# Patient Record
Sex: MALE | Employment: OTHER | URBAN - METROPOLITAN AREA
[De-identification: names, ages, dates, MRNs, and addresses within clinical notes are randomized per-mention and may not be internally consistent; named-entity substitution may affect disease eponyms.]

---

## 2020-04-17 ENCOUNTER — HOSPITAL ENCOUNTER (EMERGENCY)
Age: 35
Discharge: HOME | End: 2020-04-18
Attending: EMERGENCY MEDICINE
Payer: OTHER GOVERNMENT

## 2020-04-17 DIAGNOSIS — S61.412A LACERATION OF LEFT HAND WITHOUT FOREIGN BODY, INITIAL ENCOUNTER: ICD-10-CM

## 2020-04-17 DIAGNOSIS — S61.411A LACERATION OF RIGHT HAND WITHOUT FOREIGN BODY, INITIAL ENCOUNTER: Primary | ICD-10-CM

## 2020-04-17 DIAGNOSIS — S41.111A LACERATION OF RIGHT UPPER EXTREMITY, INITIAL ENCOUNTER: ICD-10-CM

## 2020-04-17 PROCEDURE — 99284 EMERGENCY DEPT VISIT MOD MDM: CPT

## 2020-04-17 PROCEDURE — 75810000293 HC SIMP/SUPERF WND  RPR

## 2020-04-17 RX ORDER — LIDOCAINE HYDROCHLORIDE 10 MG/ML
10 INJECTION, SOLUTION EPIDURAL; INFILTRATION; INTRACAUDAL; PERINEURAL ONCE
Status: COMPLETED | OUTPATIENT
Start: 2020-04-17 | End: 2020-04-18

## 2020-04-18 VITALS
RESPIRATION RATE: 14 BRPM | DIASTOLIC BLOOD PRESSURE: 80 MMHG | SYSTOLIC BLOOD PRESSURE: 128 MMHG | TEMPERATURE: 98.4 F | HEART RATE: 98 BPM | OXYGEN SATURATION: 99 %

## 2020-04-18 PROCEDURE — 74011000250 HC RX REV CODE- 250: Performed by: EMERGENCY MEDICINE

## 2020-04-18 PROCEDURE — 74011250637 HC RX REV CODE- 250/637: Performed by: EMERGENCY MEDICINE

## 2020-04-18 RX ORDER — CEPHALEXIN 250 MG/1
500 CAPSULE ORAL
Status: COMPLETED | OUTPATIENT
Start: 2020-04-18 | End: 2020-04-18

## 2020-04-18 RX ORDER — CEPHALEXIN 500 MG/1
500 CAPSULE ORAL 4 TIMES DAILY
Qty: 20 CAP | Refills: 0 | Status: SHIPPED | OUTPATIENT
Start: 2020-04-18 | End: 2020-04-23

## 2020-04-18 RX ADMIN — CEPHALEXIN 500 MG: 250 CAPSULE ORAL at 00:28

## 2020-04-18 RX ADMIN — LIDOCAINE HYDROCHLORIDE 10 ML: 10 INJECTION, SOLUTION EPIDURAL; INFILTRATION; INTRACAUDAL; PERINEURAL at 00:29

## 2020-04-18 NOTE — DISCHARGE INSTRUCTIONS
Patient Education        Cuts: Care Instructions  Your Care Instructions  A cut can happen anywhere on your body. Stitches, staples, skin adhesives, or pieces of tape called Steri-Strips are sometimes used to keep the edges of a cut together and help it heal. Steri-Strips can be used by themselves or with stitches or staples. Sometimes cuts are left open. If the cut went deep and through the skin, the doctor may have closed the cut in two layers. A deeper layer of stitches brings the deep part of the cut together. These stitches will dissolve and don't need to be removed. The upper layer closure, which could be stitches, staples, Steri-Strips, or adhesive, is what you see on the cut. A cut is often covered by a bandage. The doctor has checked you carefully, but problems can develop later. If you notice any problems or new symptoms, get medical treatment right away. Follow-up care is a key part of your treatment and safety. Be sure to make and go to all appointments, and call your doctor if you are having problems. It's also a good idea to know your test results and keep a list of the medicines you take. How can you care for yourself at home? If a cut is open or closed  · Prop up the sore area on a pillow anytime you sit or lie down during the next 3 days. Try to keep it above the level of your heart. This will help reduce swelling. · Keep the cut dry for the first 24 to 48 hours. After this, you can shower if your doctor okays it. Pat the cut dry. · Don't soak the cut, such as in a bathtub. Your doctor will tell you when it's safe to get the cut wet. · After the first 24 to 48 hours, clean the cut with soap and water 2 times a day unless your doctor gives you different instructions. ? Don't use hydrogen peroxide or alcohol, which can slow healing. ? You may cover the cut with a thin layer of petroleum jelly and a nonstick bandage.   ? If the doctor put a bandage over the cut, put on a new bandage after cleaning the cut or if the bandage gets wet or dirty. · Avoid any activity that could cause your cut to reopen. · Be safe with medicines. Read and follow all instructions on the label. ? If the doctor gave you a prescription medicine for pain, take it as prescribed. ? If you are not taking a prescription pain medicine, ask your doctor if you can take an over-the-counter medicine. If the cut is closed with stitches, staples, or Steri-Strips  · Follow the above instructions for open or closed cuts. · Do not remove the stitches or staples on your own. Your doctor will tell you when to come back to have the stitches or staples removed. · Leave Steri-Strips on until they fall off. If the cut is closed with a skin adhesive  · Follow the above instructions for open or closed cuts. · Leave the skin adhesive on your skin until it falls off on its own. This may take 5 to 10 days. · Do not scratch, rub, or pick at the adhesive. · Do not put the sticky part of a bandage directly on the adhesive. · Do not put any kind of ointment, cream, or lotion over the area. This can make the adhesive fall off too soon. Do not use hydrogen peroxide or alcohol, which can slow healing. When should you call for help? Call your doctor now or seek immediate medical care if:    · You have new pain, or your pain gets worse.     · The skin near the cut is cold or pale or changes color.     · You have tingling, weakness, or numbness near the cut.     · The cut starts to bleed, and blood soaks through the bandage. Oozing small amounts of blood is normal.     · You have trouble moving the area near the cut.     · You have symptoms of infection, such as:  ? Increased pain, swelling, warmth, or redness around the cut.  ? Red streaks leading from the cut.  ? Pus draining from the cut.  ? A fever.    Watch closely for changes in your health, and be sure to contact your doctor if:    · The cut reopens.     · You do not get better as expected. Where can you learn more? Go to http://nikhil-yogesh.info/  Enter M735 in the search box to learn more about \"Cuts: Care Instructions. \"  Current as of: June 26, 2019Content Version: 12.4  © 9573-0125 Healthwise, Incorporated. Care instructions adapted under license by [employer] (which disclaims liability or warranty for this information). If you have questions about a medical condition or this instruction, always ask your healthcare professional. [place] [address] any warranty or liability for your use of this information. Patient Education        Cuts on the Hand Closed With Stitches: Care Instructions  Your Care Instructions    A cut on your hand can be on your fingers, your thumb, or the front or back of your hand. Sometimes a cut can injure the tendons, blood vessels, or nerves of your hand. The doctor used stitches to close the cut. Using stitches also helps the cut heal and reduces scarring. The doctor may have given you a splint to help prevent you from moving your hand, fingers, or thumb. If the cut went deep and through the skin, the doctor put in two layers of stitches. The deeper layer brings the deep part of the cut together. These stitches will dissolve and don't need to be removed. The stitches in the upper layer are the ones you see on the cut. You will probably have a bandage. You will need to have the stitches removed, usually in 7 to 14 days. The doctor may suggest that you see a hand specialist if the cut is very deep or if you have trouble moving your fingers or have less feeling in your hand. The doctor has checked you carefully, but problems can develop later. If you notice any problems or new symptoms, get medical treatment right away. Follow-up care is a key part of your treatment and safety. Be sure to make and go to all appointments, and call your doctor if you are having problems.  It's also a good idea to know your test results and keep a list of the medicines you take. How can you care for yourself at home? · Keep the cut dry for the first 24 to 48 hours. After this, you can shower if your doctor okays it. Pat the cut dry. · Don't soak the cut, such as in a bathtub. Your doctor will tell you when it's safe to get the cut wet. · If your doctor told you how to care for your cut, follow your doctor's instructions. If you did not get instructions, follow this general advice:  ? After the first 24 to 48 hours, wash around the cut with clean water 2 times a day. Don't use hydrogen peroxide or alcohol, which can slow healing. ? You may cover the cut with a thin layer of petroleum jelly, such as Vaseline, and a nonstick bandage. ? Apply more petroleum jelly and replace the bandage as needed. · Prop up the sore hand on a pillow anytime you sit or lie down during the next 3 days. Try to keep it above the level of your heart. This will help reduce swelling. · Avoid any activity that could cause your cut to reopen. · Do not remove the stitches on your own. Your doctor will tell you when to come back to have the stitches removed. · Be safe with medicines. Take pain medicines exactly as directed. ? If the doctor gave you a prescription medicine for pain, take it as prescribed. ? If you are not taking a prescription pain medicine, ask your doctor if you can take an over-the-counter medicine. When should you call for help? Call your doctor now or seek immediate medical care if:    · You have new pain, or your pain gets worse.     · The skin near the cut is cold or pale or changes color.     · You have tingling, weakness, or numbness near the cut.     · The cut starts to bleed, and blood soaks through the bandage. Oozing small amounts of blood is normal.     · You have trouble moving the area of the hand near the cut.     · You have symptoms of infection, such as:  ? Increased pain, swelling, warmth, or redness around the cut.  ?  Red streaks leading from the cut.  ? Pus draining from the cut.  ? A fever.    Watch closely for changes in your health, and be sure to contact your doctor if:    · You do not get better as expected. Where can you learn more? Go to http://nikhil-yogesh.info/  Enter T250 in the search box to learn more about \"Cuts on the Hand Closed With Stitches: Care Instructions. \"  Current as of: June 26, 2019Content Version: 12.4  © 7770-5636 Healthwise, Incorporated. Care instructions adapted under license by [employer] (which disclaims liability or warranty for this information). If you have questions about a medical condition or this instruction, always ask your healthcare professional. [address] any warranty or liability for your use of this information.

## 2020-04-18 NOTE — ED NOTES
Pt resting, watching TV, no acute distress at this time. Call bell in reach. Will continue to monitor.

## 2020-04-18 NOTE — ED PROVIDER NOTES
HPI is a 77-year-old male who was using a drone to take pictures of his house. There was a malfunction of the drone and he went to grab it. The drone blades cut his right hand, right forearm and left hand. Incident occurred 30 minutes ago. Past Medical History:   Diagnosis Date    Testicular cancer [facility])        History reviewed. No pertinent surgical history. History reviewed. No pertinent family history. Social History     Socioeconomic History    Marital status:      Spouse name: Not on file    Number of children: Not on file    Years of education: Not on file    Highest education level: Not on file   Occupational History    Not on file   Social Needs    Financial resource strain: Not on file    Food insecurity     Worry: Not on file     Inability: Not on file    Transportation needs     Medical: Not on file     Non-medical: Not on file   Tobacco Use    Smoking status: Never Smoker    Smokeless tobacco: Never Used   Substance and Sexual Activity    Alcohol use: Yes    Drug use: Not on file    Sexual activity: Not on file   Lifestyle    Physical activity     Days per week: Not on file     Minutes per session: Not on file    Stress: Not on file   Relationships    Social connections     Talks on phone: Not on file     Gets together: Not on file     Attends Yazdanism service: Not on file     Active member of club or organization: Not on file     Attends meetings of clubs or organizations: Not on file     Relationship status: Not on file    Intimate partner violence     Fear of current or ex partner: Not on file     Emotionally abused: Not on file     Physically abused: Not on file     Forced sexual activity: Not on file   Other Topics Concern    Not on file   Social History Narrative    Not on file         ALLERGIES: Patient has no known allergies. Review of Systems   Musculoskeletal:        RIGHT HAND: multiple cuts on his fingers. No other injury to his body.        Vitals: 04/17/20 2018 04/17/20 2159 04/17/20 2229   BP: (!) 143/103     Pulse: 86     Resp: 16     Temp: 98.7 °F (37.1 °C)     SpO2: 100% 100% 98%            Physical Exam  Musculoskeletal:      Comments: RIGHT HAND:  (+) 1/2 spiral  laceration of distal index finger: normal ROM, pulses and sensory, volar surface    1/2 spiral  laceration of distal ring finger: normal ROM, pulses and sensory over volar surface          MDM       Wound Repair  Date/Time: 4/17/2020 11:20 PM  Preparation: skin prepped with Shur-Clens  Location details: left index finger  Wound length:2.5 cm or less  Anesthesia: local infiltration    Anesthesia:  Local Anesthetic: lidocaine 1% without epinephrine  Anesthetic total: 2 mL  Foreign bodies: no foreign bodies  Irrigation solution: saline  Irrigation method: jet lavage  Skin closure: 4-0 nylon and 5-0 nylon  Technique: simple  Approximation: loose  My total time at bedside, performing this procedure was 1-15 minutes. Right forearm  2 cm laceration  Prepped wound with Zion schafer and saline  1% plain lidocaine 1 mL  Repaired wound using three 4-0 nylon  Dressing applied    Left hand  Prepped wound with an sterile saline anesthetized area will be simply lidocaine  Wound with action    Tetanus up tto homer. Dx: lacerations of left hand finger    Disp: D/C home, keflex, wound check in 2 days, sutures removal in 8 days. Return to ER prn. Dictation disclaimer:  Please note that this dictation was completed with [employer] (Chapter 11), the computer voice recognition software. Quite often unanticipated grammatical, syntax, homophones, and other interpretive errors are inadvertently transcribed by the computer software. Please disregard these errors. Please excuse any errors that have escaped final proofreading.

## 2020-04-21 ENCOUNTER — VIRTUAL VISIT (OUTPATIENT)
Dept: FAMILY MEDICINE CLINIC | Age: 35
End: 2020-04-21

## 2020-04-21 DIAGNOSIS — Z85.47 HISTORY OF TESTICULAR CANCER: ICD-10-CM

## 2020-04-21 DIAGNOSIS — S59.911A FOREARM INJURY, RIGHT, INITIAL ENCOUNTER: ICD-10-CM

## 2020-04-21 DIAGNOSIS — S69.90XA: Primary | ICD-10-CM

## 2020-04-21 NOTE — PATIENT INSTRUCTIONS
Cuts on the Hand Closed With Stitches: Care Instructions Your Care Instructions A cut on your hand can be on your fingers, your thumb, or the front or back of your hand. Sometimes a cut can injure the tendons, blood vessels, or nerves of your hand. The doctor used stitches to close the cut. Using stitches also helps the cut heal and reduces scarring. The doctor may have given you a splint to help prevent you from moving your hand, fingers, or thumb. If the cut went deep and through the skin, the doctor put in two layers of stitches. The deeper layer brings the deep part of the cut together. These stitches will dissolve and don't need to be removed. The stitches in the upper layer are the ones you see on the cut. You will probably have a bandage. You will need to have the stitches removed, usually in 7 to 14 days. The doctor may suggest that you see a hand specialist if the cut is very deep or if you have trouble moving your fingers or have less feeling in your hand. The doctor has checked you carefully, but problems can develop later. If you notice any problems or new symptoms, get medical treatment right away. Follow-up care is a key part of your treatment and safety. Be sure to make and go to all appointments, and call your doctor if you are having problems. It's also a good idea to know your test results and keep a list of the medicines you take. How can you care for yourself at home? · Keep the cut dry for the first 24 to 48 hours. After this, you can shower if your doctor okays it. Pat the cut dry. · Don't soak the cut, such as in a bathtub. Your doctor will tell you when it's safe to get the cut wet. · If your doctor told you how to care for your cut, follow your doctor's instructions. If you did not get instructions, follow this general advice: ? After the first 24 to 48 hours, wash around the cut with clean water 2 times a day.  Don't use hydrogen peroxide or alcohol, which can slow healing. ? You may cover the cut with a thin layer of petroleum jelly, such as Vaseline, and a nonstick bandage. ? Apply more petroleum jelly and replace the bandage as needed. · Prop up the sore hand on a pillow anytime you sit or lie down during the next 3 days. Try to keep it above the level of your heart. This will help reduce swelling. · Avoid any activity that could cause your cut to reopen. · Do not remove the stitches on your own. Your doctor will tell you when to come back to have the stitches removed. · Be safe with medicines. Take pain medicines exactly as directed. ? If the doctor gave you a prescription medicine for pain, take it as prescribed. ? If you are not taking a prescription pain medicine, ask your doctor if you can take an over-the-counter medicine. When should you call for help? Call your doctor now or seek immediate medical care if: 
  · You have new pain, or your pain gets worse.  
  · The skin near the cut is cold or pale or changes color.  
  · You have tingling, weakness, or numbness near the cut.  
  · The cut starts to bleed, and blood soaks through the bandage. Oozing small amounts of blood is normal.  
  · You have trouble moving the area of the hand near the cut.  
  · You have symptoms of infection, such as: 
? Increased pain, swelling, warmth, or redness around the cut. 
? Red streaks leading from the cut. 
? Pus draining from the cut. 
? A fever.  
 Watch closely for changes in your health, and be sure to contact your doctor if: 
  · You do not get better as expected. Where can you learn more? Go to http://nikhil-yogesh.info/ Enter T250 in the search box to learn more about \"Cuts on the Hand Closed With Stitches: Care Instructions. \" Current as of: June 26, 2019Content Version: 12.4 © 5108-2008 Healthwise, Incorporated.  
Care instructions adapted under license by [employer] (which disclaims liability or warranty for this information). If you have questions about a medical condition or this instruction, always ask your healthcare professional. [address] any warranty or liability for your use of this information.

## 2020-04-21 NOTE — PROGRESS NOTES
Consent:  He and/or his healthcare decision maker is aware that this patient-initiated Telehealth encounter, conducted by synchronous (real-time) audio-video technology, is a billable service, with coverage as determined by his insurance carrier. He is aware that he may receive a bill and has provided verbal consent to proceed: Yes    I was in an office while conducting this encounter while the patient was in their home. Pursuant to the emergency declaration under the [address] [place] [address] [facility] authority and the [employer] Act, this Virtual  Visit was conducted, with patient's consent, to reduce the patient's risk of exposure to COVID-19 and provide continuity of care for an established patient. Services were provided through a video synchronous discussion virtually to substitute for in-person clinic visit. SUBJECTIVE  Chief Complaint   Patient presents with    Follow-up     ER visit for hand injury     Patient presents for hospital follow up. We reviewed the recent ER visit in detail. He was seen there as the result of an injury to his right forearm, right hand, and left hand from a drone that was landing that he tried to shut off. He says that he went to the ER and they sutured the lacerations and they placed him on Keflex. He has no complaints of fevers, purulent drainage or redness traveling from the lacerations. The patient reports doing well without any complications. Pain is under control. Past Medical History:   Diagnosis Date    Hx SBO 10/2017    Testicular cancer ([address] 2017    metastatic CA, 3 months chemotherapy before surgical removal, consolidation chemo       Current Outpatient Medications:     cephALEXin (Keflex) 500 mg capsule, Take 1 Cap by mouth four (4) times daily for 5 days. , Disp: 20 Cap, Rfl: 0    No Known Allergies    Past Surgical History:   Procedure Laterality Date    HX APPENDECTOMY  2017    during SBO surgery    HX ORCHIECTOMY Left 02/2017    HX OTHER SURGICAL Bilateral     PRK    HX OTHER SURGICAL  08/2017    retroperitoneal lymph node removal, removal of part of large bowel    HX OTHER SURGICAL  2017    small bowel obstruction     HX TONSIL AND ADENOIDECTOMY  1996     Social History     Tobacco Use    Smoking status: Never Smoker    Smokeless tobacco: Never Used   Substance Use Topics    Alcohol use: Yes     Frequency: 2-3 times a week     Drinks per session: 1 or 2    Drug use: Never     Family History   Problem Relation Age of Onset    Hypertension Father      OBJECTIVE    General:  Alert, cooperative, well appearing, in no apparent distress. Skin: He has sutures in place on his right forearm, right 5th / ring fingers, and left index finger. No redness appreciated. No streaking appreciated. ASSESSMENT / PLAN    ICD-10-CM ICD-9-CM    1. Multiple injuries of hand S69.90XA 959.4    2. Forearm injury, right, initial encounter S59.911A 959.3    3. History of testicular cancer Z85.47 V10.47      Multiple injuries of hand and forearm - wounds do not appear infected. He will continue current care with antibiotics and wound care. I will see him back in 8 days for suture removal.  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 History of testicular cancer - he is following with an oncologist regularly. He has been cancer free. 22 minutes of virtual face to face time spent with the patient. All chart history elements were reviewed by me at the time of the visit even though marked at time of note closure. Patient understands our medical plan. Patient has provided input and agrees with goals. Alternatives have been explained and offered. All questions answered. The patient is to call if condition worsens or fails to improve. RTC as scheduled.

## 2020-04-29 ENCOUNTER — OFFICE VISIT (OUTPATIENT)
Dept: FAMILY MEDICINE CLINIC | Age: 35
End: 2020-04-29

## 2020-04-29 VITALS
SYSTOLIC BLOOD PRESSURE: 118 MMHG | HEIGHT: 69 IN | BODY MASS INDEX: 19.99 KG/M2 | RESPIRATION RATE: 14 BRPM | DIASTOLIC BLOOD PRESSURE: 82 MMHG | OXYGEN SATURATION: 98 % | TEMPERATURE: 97.3 F | HEART RATE: 90 BPM | WEIGHT: 135 LBS

## 2020-04-29 DIAGNOSIS — S69.90XA: Primary | ICD-10-CM

## 2020-04-29 DIAGNOSIS — S59.911A FOREARM INJURY, RIGHT, INITIAL ENCOUNTER: ICD-10-CM

## 2020-04-29 NOTE — PROGRESS NOTES
[facility]  OFFICE PROCEDURE PROGRESS NOTE        Chart reviewed for the following:   IMaryse MD, have reviewed the History, Physical and updated the Allergic reactions for [address] performed immediately prior to start of procedure:   Alfredito Peck MD, have performed the following reviews on Tamara Ayala prior to the start of the procedure:            * Patient was identified by name and date of birth   * Agreement on procedure being performed was verified  * Risks and Benefits explained to the patient  * Procedure site verified and marked as necessary  * Patient was positioned for comfort  * Consent was signed and verified     Time: 2p      Date of procedure: 4/29/2020    Procedure performed by: Maryse Samuel MD    Provider assisted by: MD    Patient assisted by: self    Removed all sutures without complications. Placed bacitracin ointments over wounds.

## 2020-04-29 NOTE — PROGRESS NOTES
SUBJECTIVE  Chief Complaint   Patient presents with    Suture Removal     right 5th digit     Patient presents for ER follow-up. He was seen there as the result of an injury to his right forearm, right hand, and left hand from a drone that was landing. He has completed the Keflex. His right 5th finger seems to be slightly more stiff than when he initially was sutured he says. Past Medical History:   Diagnosis Date    Hx SBO 10/2017    Testicular cancer ([facility] [address] 2017    metastatic CA, 3 months chemotherapy before surgical removal, consolidation chemo     No current outpatient medications on file. No Known Allergies    Past Surgical History:   Procedure Laterality Date    HX APPENDECTOMY  2017    during SBO surgery    HX ORCHIECTOMY Left 02/2017    HX OTHER SURGICAL Bilateral     PRK    HX OTHER SURGICAL  08/2017    retroperitoneal lymph node removal, removal of part of large bowel    HX OTHER SURGICAL  2017    small bowel obstruction     HX TONSIL AND ADENOIDECTOMY  1996     Social History     Tobacco Use    Smoking status: Never Smoker    Smokeless tobacco: Never Used   Substance Use Topics    Alcohol use: Yes     Frequency: 2-3 times a week     Drinks per session: 1 or 2    Drug use: Never     Family History   Problem Relation Age of Onset    Hypertension Father      OBJECTIVE    General:  Alert, cooperative, well appearing, in no apparent distress. Right hand:  He has full ROM of his hand and fingers with exception of slight decrease of flexion of his distal 5th phalanx. Skin: He has sutures in place on his right forearm, right 5th / ring fingers, and left index finger. No redness appreciated. No streaking appreciated. ASSESSMENT / PLAN    ICD-10-CM ICD-9-CM    1. Multiple injuries of hand S69.90XA 959.4    2. Forearm injury, right, initial encounter S59.911A 959.3      Multiple injuries of hand and forearm - wounds do not appear infected and are healing well.   Ready for suture removal.  See attached procedure note. He will continue care with his PCM at the [employer] as needed. 15 minutes of face to face time spent with the patient with at least 50% on counseling on the condition. All chart history elements were reviewed by me at the time of the visit even though marked at time of note closure. Patient understands our medical plan. Patient has provided input and agrees with goals. Alternatives have been explained and offered. All questions answered. The patient is to call if condition worsens or fails to improve. RTC as needed.

## 2020-04-29 NOTE — PROGRESS NOTES
1. Have you been to the ER, urgent care clinic since your last visit? Hospitalized since your last visit? No    2. Have you seen or consulted any other health care providers outside of the [address] [place] [address] since your last visit? Include any pap smears or colon screening.  No